# Patient Record
Sex: MALE | Race: WHITE
[De-identification: names, ages, dates, MRNs, and addresses within clinical notes are randomized per-mention and may not be internally consistent; named-entity substitution may affect disease eponyms.]

---

## 2018-09-06 ENCOUNTER — HOSPITAL ENCOUNTER (EMERGENCY)
Dept: HOSPITAL 74 - JERFT | Age: 33
Discharge: HOME | End: 2018-09-06
Payer: COMMERCIAL

## 2018-09-06 VITALS — BODY MASS INDEX: 29 KG/M2

## 2018-09-06 VITALS — HEART RATE: 80 BPM | SYSTOLIC BLOOD PRESSURE: 163 MMHG | DIASTOLIC BLOOD PRESSURE: 83 MMHG | TEMPERATURE: 98.1 F

## 2018-09-06 DIAGNOSIS — J45.21: Primary | ICD-10-CM
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
## 2018-09-06 NOTE — PDOC
History of Present Illness





- General


Chief Complaint: Pain, Acute


Stated Complaint: KNEE PAIN/YFD


Time Seen by Provider: 09/06/18 19:30


History Source: Patient


Exam Limitations: No Limitations





Past History





- Travel


Traveled outside of the country in the last 30 days: No


Close contact w/someone who was outside of country & ill: No





- Past Medical History


Allergies/Adverse Reactions: 


 Allergies











Allergy/AdvReac Type Severity Reaction Status Date / Time


 


No Known Allergies Allergy   Verified 09/06/18 19:30











Home Medications: 


Ambulatory Orders





Ibuprofen 800 mg PO TID #30 tablet 09/06/18 








COPD: No





- Immunization History


Immunization Up to Date: Yes





- Suicide/Smoking/Psychosocial Hx


Smoking History: Never smoked


Have you smoked in the past 12 months: No


Hx Alcohol Use: Yes (occasion)


Drug/Substance Use Hx: No


Substance Use Type: None





**Review of Systems





- Review of Systems


Able to Perform ROS?: Yes


Comments:: 





09/06/18 20:17


CONSTITUTIONAL: 


Absent: fever, chills, diaphoresis, generalized weakness, malaise, loss of 

appetite


HEENT: 


Absent: rhinorrhea, nasal congestion, throat pain, throat swelling, difficulty 

swallowing, mouth swelling, ear pain, eye pain, visual Changes


CARDIOVASCULAR: 


Absent: chest pain, loss of consciousness, palpitations, irregular heart rate, 

peripheral edema


RESPIRATORY: 


Absent: cough, shortness of breath, dyspnea with exertion, orthopnea, wheezing, 

stridor, hemoptysis


GASTROINTESTINAL:


Absent: abdominal pain, abdominal distension, nausea, vomiting, diarrhea, 

constipation, melena, hematochezia


GENITOURINARY: 


Absent: dysuria, frequency, urgency, hesitancy, hematuria, flank pain, genital 

pain


MUSCULOSKELETAL: 


Present: L knee unsteadyness Absent: myalgia, arthralgia, joint swelling


SKIN: 


Absent: rash, itching, pallor


HEMATOLOGIC/IMMUNOLOGIC: 


Absent: easy bleeding, easy bruising, lymphadenopathy, frequent infections


ENDOCRINE:


Absent: unexplained weight gain, unexplained weight loss, heat intolerance, 

cold intolerance


NEUROLOGIC: 


Absent: headache, focal weakness or paresthesias, dizziness, unsteady gait, 

seizure, mental status changes, bladder or bowel incontinence


PSYCHIATRIC: 


Absent: anxiety, depression, suicidal or homicidal ideation, hallucinations.





Is the patient limited English proficient: No





*Physical Exam





- Vital Signs


 Last Vital Signs











Temp Pulse Resp BP Pulse Ox


 


 98.1 F   80   18   163/83   98 


 


 09/06/18 19:29  09/06/18 19:29  09/06/18 19:29  09/06/18 19:29  09/06/18 19:29














- Physical Exam


Comments: 





09/06/18 20:17


GENERAL:


Well developed, well nourished. Awake and alert. No acute distress.


MUSCULOSKELETAL 


No TTP of the L knee. Knee is stable, (-) anterior draw, lachmans, posterior 

draw, varus/valugs stressing, Mc'Murrys testing. Normal range of motion at all 

joints. No bony deformities or tenderness. No CVA tenderness.


EXTREMITIES: 


No cyanosis. No clubbing. No edema. No calf tenderness.


SKIN: 


Warm and dry. Normal capillary refill. No rashes. No jaundice. 


NEUROLOGICAL: 


Alert, awake, appropriate. Cranial nerves 2-12 intact. No deficits to light 

touch and temperature in face, upper extremities and lower extremities. No 

motor deficits in the in face, upper extremities and lower extremities. 

Normoreflexic in the upper and lower extremities. Normal speech. Toes are down-

going bilaterally. Gait is normal without ataxia.


PSYCHIATRIC: 


Cooperative. Good eye contact. Appropriate mood and affect.











*DC/Admit/Observation/Transfer


Diagnosis at time of Disposition: 


Left knee sprain


Qualifiers:


 Encounter type: initial encounter Involved ligament of knee: unspecified 

ligament Qualified Code(s): S83.92XA - Sprain of unspecified site of left knee, 

initial encounter








- Discharge Dispostion


Disposition: HOME


Condition at time of disposition: Stable


Decision to Admit order: No





- Referrals


Referrals: 


Yon Lynn MD [Primary Care Provider] - 





- Patient Instructions


Printed Discharge Instructions:  DI for Knee Sprain


Additional Instructions: 


You sprained your knee. Your x-ray was negative for broken bones.


Please keep your knee elevated while at rest above the level of your heart to 

reduce swelling.


You may take Motrin 800 mg every 8 hours to help reduce pain and swelling.


Please ice the area for 20 minute intervals at least 5 times a day to help 

reduce swelling.


Please wear the Ace wrap. 


Please follow-up with orthopedics in 1 week if your symptoms are not improving.





Return to the emergency department if you have worsening pain, or unable to walk

, numbness and tingling of the foot, or had any changes in her symptoms.





- Post Discharge Activity


Forms/Work/School Notes:  Back to Work

## 2020-07-05 ENCOUNTER — HOSPITAL ENCOUNTER (EMERGENCY)
Dept: HOSPITAL 74 - JER | Age: 35
Discharge: HOME | End: 2020-07-05
Payer: COMMERCIAL

## 2020-07-05 VITALS — HEART RATE: 65 BPM | DIASTOLIC BLOOD PRESSURE: 70 MMHG | SYSTOLIC BLOOD PRESSURE: 119 MMHG

## 2020-07-05 VITALS — BODY MASS INDEX: 39.9 KG/M2

## 2020-07-05 VITALS — TEMPERATURE: 97.5 F

## 2020-07-05 DIAGNOSIS — R51: Primary | ICD-10-CM

## 2020-07-05 PROCEDURE — 3E033GC INTRODUCTION OF OTHER THERAPEUTIC SUBSTANCE INTO PERIPHERAL VEIN, PERCUTANEOUS APPROACH: ICD-10-PCS

## 2020-07-05 PROCEDURE — 3E0337Z INTRODUCTION OF ELECTROLYTIC AND WATER BALANCE SUBSTANCE INTO PERIPHERAL VEIN, PERCUTANEOUS APPROACH: ICD-10-PCS

## 2020-07-05 PROCEDURE — 3E033NZ INTRODUCTION OF ANALGESICS, HYPNOTICS, SEDATIVES INTO PERIPHERAL VEIN, PERCUTANEOUS APPROACH: ICD-10-PCS
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
